# Patient Record
Sex: FEMALE | Race: WHITE | Employment: FULL TIME | URBAN - METROPOLITAN AREA
[De-identification: names, ages, dates, MRNs, and addresses within clinical notes are randomized per-mention and may not be internally consistent; named-entity substitution may affect disease eponyms.]

---

## 2021-07-26 ENCOUNTER — APPOINTMENT (OUTPATIENT)
Dept: CT IMAGING | Age: 26
End: 2021-07-26
Payer: COMMERCIAL

## 2021-07-26 ENCOUNTER — HOSPITAL ENCOUNTER (EMERGENCY)
Age: 26
Discharge: HOME | End: 2021-07-26
Attending: EMERGENCY MEDICINE
Payer: COMMERCIAL

## 2021-07-26 VITALS
TEMPERATURE: 97.7 F | DIASTOLIC BLOOD PRESSURE: 88 MMHG | BODY MASS INDEX: 49.09 KG/M2 | HEIGHT: 61 IN | OXYGEN SATURATION: 98 % | RESPIRATION RATE: 18 BRPM | HEART RATE: 98 BPM | SYSTOLIC BLOOD PRESSURE: 150 MMHG | WEIGHT: 260 LBS

## 2021-07-26 DIAGNOSIS — S13.9XXA CERVICAL SPRAIN, INITIAL ENCOUNTER: Primary | ICD-10-CM

## 2021-07-26 DIAGNOSIS — S09.90XA INJURY OF HEAD, INITIAL ENCOUNTER: ICD-10-CM

## 2021-07-26 PROCEDURE — 70450 CT HEAD/BRAIN W/O DYE: CPT

## 2021-07-26 PROCEDURE — 72125 CT NECK SPINE W/O DYE: CPT

## 2021-07-26 PROCEDURE — 99285 EMERGENCY DEPT VISIT HI MDM: CPT

## 2021-07-26 RX ORDER — CYCLOBENZAPRINE HCL 10 MG
10 TABLET ORAL 3 TIMES DAILY PRN
Qty: 21 TABLET | Refills: 0 | Status: SHIPPED | OUTPATIENT
Start: 2021-07-26 | End: 2021-08-05

## 2021-07-26 ASSESSMENT — PAIN DESCRIPTION - PAIN TYPE: TYPE: ACUTE PAIN

## 2021-07-26 ASSESSMENT — PAIN DESCRIPTION - LOCATION: LOCATION: HEAD

## 2021-07-26 ASSESSMENT — PAIN SCALES - GENERAL: PAINLEVEL_OUTOF10: 2

## 2021-07-26 ASSESSMENT — ENCOUNTER SYMPTOMS: NAUSEA: 1

## 2021-07-26 ASSESSMENT — PAIN DESCRIPTION - FREQUENCY: FREQUENCY: INTERMITTENT

## 2021-07-26 ASSESSMENT — PAIN DESCRIPTION - ORIENTATION: ORIENTATION: RIGHT

## 2021-07-26 ASSESSMENT — PAIN DESCRIPTION - DESCRIPTORS: DESCRIPTORS: ACHING

## 2021-07-26 NOTE — ED PROVIDER NOTES
[id number] [facility] ED  [address] [facility] [address]  Phone: [phone]  Fax: [phone]        Pt Name: John Johnson  MRN: [id number]  [place] 1995  Date of evaluation: 7/26/21      CHIEF COMPLAINT     Chief Complaint   Patient presents with    Headache     s/p fall 7/23    Nausea         HISTORY OF PRESENT ILLNESS  (Location/Symptom, Timing/Onset, Context/Setting, Quality, Duration, Modifying Factors, Severity.)    John Johnson is a 23 y.o. female who presents with a head injury. The patient dates on Friday she slipped and fell down some steps the patient states later that day she started developing a right-sided headache with some nausea states that she has a history of migraines but this is not as severe as a typical migraine states she also has some right posterior neck pain no numbness or weakness to the arms or legs no visual or hearing changes nothing she does makes her symptoms better or worse      REVIEW OF SYSTEMS    (2-9 systems for level 4, 10 or more for level 5)     Review of Systems   Gastrointestinal: Positive for nausea. Musculoskeletal: Positive for neck pain. Neurological: Positive for headaches. All other systems reviewed and are negative. PAST MEDICAL HISTORY    has a past medical history of Headache. SURGICAL HISTORY      has no past surgical history on file. CURRENTMEDICATIONS       Previous Medications    No medications on file       ALLERGIES     is allergic to augmentin [amoxicillin-pot clavulanate]. FAMILY HISTORY     has no family status information on file. family history is not on file. SOCIAL HISTORY          PHYSICAL EXAM    (up to 7 for level 4, 8 or more for level 5)   INITIAL VITALS:  height is 5' 1\" (1.549 m) and weight is 117.9 kg (260 lb). Her blood pressure is 150/88 (abnormal) and her pulse is 98. Her respiration is 18 and oxygen saturation is 98%. Physical Exam  Vitals and nursing note reviewed. Constitutional:       Appearance: Normal appearance. HENT:      Head: Normocephalic and atraumatic. Right Ear: Tympanic membrane normal.      Left Ear: Tympanic membrane normal.      Ears:      Comments: No hemotympanum  Eyes:      Extraocular Movements: Extraocular movements intact. Conjunctiva/sclera: Conjunctivae normal.      Pupils: Pupils are equal, round, and reactive to light. Neck:      Comments: Patient is noted to have some right posterior cervical area discomfort  Cardiovascular:      Rate and Rhythm: Normal rate and regular rhythm. Pulmonary:      Effort: Pulmonary effort is normal.      Breath sounds: Normal breath sounds. Chest:      Chest wall: No tenderness. Abdominal:      General: There is no distension. Palpations: Abdomen is soft. Tenderness: There is no abdominal tenderness. There is no guarding. Musculoskeletal:         General: No swelling or tenderness. Normal range of motion. Skin:     General: Skin is warm and dry. Findings: No rash. Neurological:      General: No focal deficit present. Mental Status: She is alert. Comments: GCS of 15 with no focal deficits appreciated no cerebellar deficits         DIFFERENTIAL DIAGNOSIS/ MDM:     We will get a CT head and CT of the C-spine    DIAGNOSTIC RESULTS         RADIOLOGY:        Interpretation per the Radiologist below, if available at the time of this note:    [address] (Final result)  Result time 07/26/21 18:24:07  Final result by Lea Starr MD (07/26/21 18:24:07)                Impression:    No acute intracranial abnormality.              Narrative:    EXAMINATION:   CT OF THE HEAD WITHOUT CONTRAST  7/26/2021 6:09 pm     TECHNIQUE:   CT of the head was performed without the administration of intravenous   contrast. Dose modulation, iterative reconstruction, and/or weight based   adjustment of the mA/kV was utilized to reduce the radiation dose to as low   as reasonably achievable. COMPARISON:   None. HISTORY:   ORDERING SYSTEM PROVIDED HISTORY: fall   TECHNOLOGIST PROVIDED HISTORY:     fall   Decision Support Exception - unselect if not a suspected or confirmed   emergency medical condition->Emergency Medical Condition (MA)   Is the patient pregnant?->No   Reason for Exam: headache   Acuity: Acute   Type of Exam: Initial   Mechanism of Injury: fall     FINDINGS:   BRAIN/VENTRICLES: There is no acute intracranial hemorrhage, mass effect or   midline shift.  No abnormal extra-axial fluid collection.  The gray-white   differentiation is maintained without evidence of an acute infarct.  There is   no evidence of hydrocephalus. ORBITS: The visualized portion of the orbits demonstrate no acute abnormality. SINUSES: The visualized paranasal sinuses and mastoid air cells demonstrate   no acute abnormality. SOFT TISSUES/SKULL:  No acute abnormality of the visualized skull or soft   tissues.                     CT CERVICAL SPINE WO CONTRAST (Final result)  Result time 07/26/21 18:28:03  Final result by Travis Barbosa MD (07/26/21 18:28:03)                Impression:    No acute fracture of cervical spine. Vickie Scrivener is reversal of normal cervical   lordosis this may be due to positioning. Sudie Due no evidence for facet   disruption.  The no evidence for prevertebral soft tissue swelling. Narrative:    EXAMINATION:   CT OF THE CERVICAL SPINE WITHOUT CONTRAST 7/26/2021 6:09 pm     TECHNIQUE:   CT of the cervical spine was performed without the administration of   intravenous contrast. Multiplanar reformatted images are provided for review. Dose modulation, iterative reconstruction, and/or weight based adjustment of   the mA/kV was utilized to reduce the radiation dose to as low as reasonably   achievable. COMPARISON:   None.      HISTORY:   ORDERING SYSTEM PROVIDED HISTORY: fall   TECHNOLOGIST PROVIDED HISTORY:   fall   Decision Support Exception - unselect if not a suspected or confirmed   emergency medical condition->Emergency Medical Condition (MA)   Is the patient pregnant?->No   Reason for Exam: neck pain   Acuity: Acute   Type of Exam: Initial   Mechanism of Injury: fall     FINDINGS:   BONES/ALIGNMENT: There is reversal of normal cervical lordosis centered at C   5 6.  Part of this may be due to positioning.  No evidence for fracture or   malalignment.  The facets are unremarkable. Dewitte Peto is no prevertebral soft   tissue swelling. DEGENERATIVE CHANGES: No significant degenerative changes. SOFT TISSUES: There is no prevertebral soft tissue swelling. LABS:  No results found for this visit on 07/26/21. EMERGENCY DEPARTMENT COURSE:   Vitals:    Vitals:    07/26/21 1747   BP: (!) 150/88   Pulse: 98   Resp: 18   SpO2: 98%   Weight: 117.9 kg (260 lb)   Height: 5' 1\" (1.549 m)     -------------------------  BP: (!) 150/88,  , Pulse: 98, Resp: 18      RE-EVALUATION:  Patient presents after a fall was noted to have clinically a cervical sprain CT showed no acute process it did show some straightening of the normal curvature of the spine the patient is neurovascularly intact given this I do feel able to be followed up as an outpatient I will write a prescription for Flexeril given the cervical sprain  The patient presents with a closed head injury. The patient is neurologically intact. The presentation does not suggest a serious head injury. Signs and symptoms of a serious head injury have been discussed with the patient and caregiver, who will be vigilant for these. Concerns of repeat head injury have also been discussed. The patient has been observed adequately in the ED. Pt has been instructed to return to the ED if symptoms do not go away or worsen or change in any way.      The patient understands that at this time there is no evidence for a more malignant underlying process, but the patient also understands that early in the process of an illness or injury, an emergency department workup can be falsely reassuring. Routine discharge counseling was given, and the patient understands that worsening, changing or persistent symptoms should prompt an immediate call or follow up with their primary physician or return to the emergency department. The importance of appropriate follow up was also discussed. I have reviewed the disposition diagnosis with the patient and or their family/guardian. I have answered their questions and given discharge instructions. They voiced understanding of these instructions and did not have any further questions or complaints. PROCEDURES:  None    FINAL IMPRESSION      1. Cervical sprain, initial encounter    2. Injury of head, initial encounter          DISPOSITION/PLAN   DISPOSITION        CONDITION ON DISPOSITION:   Stable    PATIENT REFERRED TO:    Your Family Doctor or the Family Doctor of your Choice Calling [phone]  Call in 2 days        DISCHARGE MEDICATIONS:  New Prescriptions    CYCLOBENZAPRINE (FLEXERIL) 10 MG TABLET    Take 1 tablet by mouth 3 times daily as needed for Muscle spasms       (Please note that portions of this note were completed with a voicerecognition program.  Efforts were made to edit the dictations but occasionally words are mis-transcribed.)    Daryle Makua, MD,, MD, F.A.C.E.P.   Attending Emergency Medicine Physician       Daryle Makua, MD  07/26/21 0951

## 2021-07-26 NOTE — LETTER
[id number] [facility] ED  [address] [facility] [address]  Phone: [phone]  Fax: [phone]               July 26, 2021    Patient: Larissa Copeland   YOB: 1995   Date of Visit: 7/26/2021       To Whom It May Concern:    Larissa Copeland was seen and treated in our emergency department on 7/26/2021.  She may return to work on 7/28/2021      Sincerely,       Dr. Candido Booker      Signature:__________________________________